# Patient Record
(demographics unavailable — no encounter records)

---

## 2025-02-12 NOTE — DISCUSSION/SUMMARY
[de-identified] : Chief complaint: Left knee injury  HPI: Patient is a 45-year-old female who presents the office today for the evaluation of a left knee injury sustained on Monday, 2/10/2025.  Patient reports that she was skiing when she fell twisting the left knee.  She felt a pop in the left knee.  This was accompanied by immediate pain.  She was unable to put weight on the left knee following the fall.  She presented to [facility] yesterday, 2/11/2025, at which time three-view x-rays of the left knee, 2 view x-rays of the left femur, and 1-2 view x-rays of the pelvis were performed with impression as per the radiologist of no acute displaced fracture.  Patient was provided with crutches and was told to follow-up with orthopedics.  Patient has ongoing pain to the diffuse knee.  Has been taking Advil with some relief of her discomfort.  Denies any previous injury or surgery to the left knee.  ROS: Positive for left knee pain/injury  Physical examination of the LEFT knee shows: No erythema  No ecchymosis  Tenderness to palpation over suprapatellar area, medial joint line, lateral joint line  mild effusion Able to perform active straight leg raise Knee flexion from 5-10 degrees to approximately 90 degrees actively  Positive Rocio's medially, laterally No appreciable tibial translation with Anterior / Posterior Drawer No appreciable Instability with varus / valgus stress testing Calves are soft and nontender  Patient presented to [facility] on 2/11/2025 at which time three-view x-rays of the left knee, 2 view x-rays of the left femur, and 1-2 views of the pelvis were performed with impression as per the radiologist: No acute displaced fracture  Assessment/plan: Left knee injury  1.  Given that the patient has a positive effusion, medial and lateral joint line tenderness, positive Rocio's medially and laterally, and x-rays performed at [facility] that show well-preserved medial and lateral joint spaces there is concern for a meniscus tear, discussed this concern with the patient, given this concern at this time we will submit for authorization for an MRI of the left knee without contrast to rule out a meniscus tear 2.  Patient provided with a prescription for a hinged knee brace which she can  from a surgical supply store, I recommend that she wear this brace while active, he can come off for resting, sleeping, and showering/hygiene, she can continue to use the crutches provided to her from [facility] to offload pressure from the left lower extremity on an as-needed basis 3.  A prescription for diclofenac 50 mg as needed twice daily with food was sent to the patient's pharmacy, confirmed no contraindications to NSAIDS. Patient denies being on a blood thinner. Denies history of GIB or GI ulcer.  Denies chance of active pregnancy.  Discussed in detail with the patient that they cannot take over-the-counter NSAIDs including but not limited to ibuprofen, Advil, Aleve, or Motrin while taking this medication.  They can continue to take over-the-counter Tylenol. 4.  Patient provided with a prescription for formal physical therapy so that she can get started on that 5.  I did discuss the option/recommendation for attempted aspiration of the knee as well as corticosteroid injection however the patient kindly deferred at this time 6.  I recommend that the patient elevate the left lower extremity above pelvis when resting and apply ice or heat to the left knee on an as-needed basis with sensory precautions  Patient will be provided with a 2-3-week follow-up with our sports medicine department for repeat evaluation, I did discuss with the patient that she needs to bring the disc as well as the impression as per the radiologist of her left knee MRI with her to the next appointment, the patient verbalized understanding of all findings in the office today, she agrees to follow-up as directed

## 2025-03-19 NOTE — HISTORY OF PRESENT ILLNESS
[de-identified] : Patient is about 5 weeks from a ski injury to her left knee which MRI showed a medial collateral ligament tear nondisplaced fracture lateral tibial plateau.   tear medial meniscus  Physical exam she got a negative Lachman she does open up slightly at 30 degrees flexion of the MCL small knee effusion good range of motion negative Homans' sign  Recommend physical therapy prescription was given we will see her back in 2 months for repeat exam films were reviewed from home to  [facility]